# Patient Record
Sex: FEMALE | URBAN - METROPOLITAN AREA
[De-identification: names, ages, dates, MRNs, and addresses within clinical notes are randomized per-mention and may not be internally consistent; named-entity substitution may affect disease eponyms.]

---

## 2022-11-21 ENCOUNTER — APPOINTMENT (RX ONLY)
Dept: URBAN - METROPOLITAN AREA CLINIC 151 | Facility: CLINIC | Age: 42
Setting detail: DERMATOLOGY
End: 2022-11-21

## 2022-11-21 DIAGNOSIS — L71.0 PERIORAL DERMATITIS: ICD-10-CM

## 2022-11-21 PROCEDURE — 99204 OFFICE O/P NEW MOD 45 MIN: CPT

## 2022-11-21 PROCEDURE — ? COUNSELING

## 2022-11-21 PROCEDURE — ? PRESCRIPTION

## 2022-11-21 PROCEDURE — ? PRESCRIPTION MEDICATION MANAGEMENT

## 2022-11-21 RX ORDER — PIMECROLIMUS 10 MG/G
CREAM TOPICAL
Qty: 60 | Refills: 1 | Status: ERX | COMMUNITY
Start: 2022-11-21

## 2022-11-21 RX ORDER — DOXYCYCLINE HYCLATE 100 MG/1
CAPSULE, GELATIN COATED ORAL
Qty: 30 | Refills: 1 | Status: ERX | COMMUNITY
Start: 2022-11-21

## 2022-11-21 RX ORDER — METRONIDAZOLE 7.5 MG/G
CREAM TOPICAL
Qty: 45 | Refills: 1 | Status: ERX | COMMUNITY
Start: 2022-11-21

## 2022-11-21 RX ADMIN — PIMECROLIMUS: 10 CREAM TOPICAL at 00:00

## 2022-11-21 RX ADMIN — METRONIDAZOLE: 7.5 CREAM TOPICAL at 00:00

## 2022-11-21 RX ADMIN — DOXYCYCLINE HYCLATE: 100 CAPSULE, GELATIN COATED ORAL at 00:00

## 2022-11-21 ASSESSMENT — INVESTIGATOR STATIC GLOBAL ASSESSMENT
IN YOUR EXPERIENCE, AMONG ALL PATIENTS YOU HAVE SEEN WITH THIS CONDITION, HOW SEVERE IS THIS PATIENT'S CONDITION?: MILD TO MODERATE

## 2022-11-21 NOTE — HPI: OTHER
Condition:: Rash
Please Describe Your Condition:: Rash is located on the pts face and lips. Rash on the face is itchy and red. Rash on the lips is itchy, red and swollen.  She has d/c’d retinol thinking she may have developed a rash after a years use. Started using CeraVe lotion on the face and Aquaphor on the lips. Stated she had a sample of Eucrisa that she tried, cleared the rash but it returned. Eucrisa was stingy. Rash has been present for 2 months.

## 2022-11-21 NOTE — PROCEDURE: PRESCRIPTION MEDICATION MANAGEMENT
Render In Strict Bullet Format?: No
Discontinue Regimen: All OTC cosmetic products
Continue Regimen: CeraVe lotion and mineral base Sunscreen of SPF 30 or higher
Detail Level: Zone
Initiate Treatment: doxycycline hyclate 100 mg capsule, Sig: Take 1 tablet PO jose with a fatty meal & glass of water. Do not lie down for 1 hour directly after taking. Take 4 hours apart from other medications or supplements.\\n\\nmetronidazole 0.75 % topical cream, Sig: Apply to face BID\\n\\npimecrolimus 1 % topical cream, Sig: Apply BID TAA of face until clear, then reapply PRN for flares.

## 2022-12-19 ENCOUNTER — RX ONLY (OUTPATIENT)
Age: 42
Setting detail: RX ONLY
End: 2022-12-19

## 2022-12-19 RX ORDER — METRONIDAZOLE 7.5 MG/G
CREAM TOPICAL
Qty: 45 | Refills: 1 | Status: ERX

## 2023-01-16 RX ORDER — DOXYCYCLINE HYCLATE 100 MG/1
CAPSULE, GELATIN COATED ORAL
Qty: 30 | Refills: 0 | Status: ERX

## 2023-02-21 ENCOUNTER — APPOINTMENT (RX ONLY)
Dept: URBAN - METROPOLITAN AREA CLINIC 151 | Facility: CLINIC | Age: 43
Setting detail: DERMATOLOGY
End: 2023-02-21

## 2023-02-21 DIAGNOSIS — L71.0 PERIORAL DERMATITIS: ICD-10-CM

## 2023-02-21 PROCEDURE — ? COUNSELING

## 2023-02-21 PROCEDURE — ? DIAGNOSIS COMMENT

## 2023-02-21 PROCEDURE — ? PRESCRIPTION MEDICATION MANAGEMENT

## 2023-02-21 PROCEDURE — ? PRESCRIPTION

## 2023-02-21 PROCEDURE — 99214 OFFICE O/P EST MOD 30 MIN: CPT

## 2023-02-21 RX ORDER — DOXYCYCLINE HYCLATE 50 MG/1
CAPSULE, GELATIN COATED ORAL
Qty: 30 | Refills: 1 | Status: ERX | COMMUNITY
Start: 2023-02-21

## 2023-02-21 RX ADMIN — DOXYCYCLINE HYCLATE: 50 CAPSULE, GELATIN COATED ORAL at 00:00

## 2023-02-21 ASSESSMENT — INVESTIGATOR STATIC GLOBAL ASSESSMENT: IN YOUR EXPERIENCE, AMONG ALL PATIENTS YOU HAVE SEEN WITH THIS CONDITION, HOW SEVERE IS THIS PATIENT'S CONDITION?: MILD

## 2023-02-21 NOTE — PROCEDURE: DIAGNOSIS COMMENT
Render Risk Assessment In Note?: no
Comment: under good control, may wait to fill the doxycycline with the hope she does not need it continue metrocream BID
Detail Level: Simple

## 2023-02-21 NOTE — PROCEDURE: PRESCRIPTION MEDICATION MANAGEMENT
Render In Strict Bullet Format?: No
Discontinue Regimen: doxycycline hyclate 100 mg capsule,
Continue Regimen: CeraVe lotion and mineral base Sunscreen of SPF 30 or higher\\n\\nmetronidazole 0.75 % topical cream, Sig: Apply to face BID
Detail Level: Zone
Initiate Treatment: doxycycline hyclate 50 mg capsule, Sig: Take one pill daily with food and a full glass of water.\\n(Only start, if she has a flare.)

## 2023-05-16 ENCOUNTER — APPOINTMENT (RX ONLY)
Dept: URBAN - METROPOLITAN AREA CLINIC 151 | Facility: CLINIC | Age: 43
Setting detail: DERMATOLOGY
End: 2023-05-16

## 2023-05-16 ENCOUNTER — RX ONLY (OUTPATIENT)
Age: 43
Setting detail: RX ONLY
End: 2023-05-16

## 2023-05-16 DIAGNOSIS — L71.0 PERIORAL DERMATITIS: ICD-10-CM

## 2023-05-16 PROCEDURE — ? DIAGNOSIS COMMENT

## 2023-05-16 PROCEDURE — 99214 OFFICE O/P EST MOD 30 MIN: CPT

## 2023-05-16 PROCEDURE — ? PRESCRIPTION

## 2023-05-16 PROCEDURE — ? PRESCRIPTION MEDICATION MANAGEMENT

## 2023-05-16 PROCEDURE — ? COUNSELING

## 2023-05-16 RX ORDER — SULFACETAMIDE SODIUM, SULFUR 100; 50 MG/G; MG/G
EMULSION TOPICAL QD
Qty: 227 | Refills: 2 | Status: ERX | COMMUNITY
Start: 2023-05-16

## 2023-05-16 RX ORDER — SULFACETAMIDE SODIUM, SULFUR 100; 50 MG/G; MG/G
EMULSION TOPICAL QD
Qty: 227 | Refills: 2 | Status: ERX

## 2023-05-16 RX ADMIN — SULFACETAMIDE SODIUM, SULFUR: 100; 50 EMULSION TOPICAL at 00:00

## 2023-05-16 ASSESSMENT — LOCATION DETAILED DESCRIPTION DERM: LOCATION DETAILED: LEFT LOWER CUTANEOUS LIP

## 2023-05-16 ASSESSMENT — LOCATION ZONE DERM: LOCATION ZONE: LIP

## 2023-05-16 ASSESSMENT — LOCATION SIMPLE DESCRIPTION DERM: LOCATION SIMPLE: LEFT LIP

## 2023-05-16 NOTE — PROCEDURE: DIAGNOSIS COMMENT
Render Risk Assessment In Note?: no
Comment: Pt concerns addressed, pt advised to continue use of Doxycycline 50 mg. Pt advised to initiate use of Sulfur face wash in addition to current regimen.
Detail Level: Simple

## 2023-05-16 NOTE — PROCEDURE: PRESCRIPTION MEDICATION MANAGEMENT
Render In Strict Bullet Format?: No
Discontinue Regimen: doxycycline hyclate 100 mg capsule,
Continue Regimen: doxycycline hyclate 50 mg capsule, Sig: Take one pill daily with food and a full glass of water.\\n(Only start, if she has a flare.)\\n\\nCeraVe lotion and mineral base Sunscreen of SPF 30 or higher\\n\\nmetronidazole 0.75 % topical cream, Sig: Apply to face BID
Detail Level: Zone
Initiate Treatment: Avar 10 %-5 %

## 2023-05-23 ENCOUNTER — RX ONLY (OUTPATIENT)
Age: 43
Setting detail: RX ONLY
End: 2023-05-23

## 2023-05-23 RX ORDER — SULFACETAMIDE SODIUM AND SULFUR 100; 50 MG/G; MG/G
CREAM TOPICAL
Qty: 340 | Refills: 2 | Status: ERX | COMMUNITY
Start: 2023-05-23

## 2023-06-06 ENCOUNTER — RX ONLY (OUTPATIENT)
Age: 43
Setting detail: RX ONLY
End: 2023-06-06

## 2023-06-06 RX ORDER — DOXYCYCLINE HYCLATE 50 MG/1
CAPSULE, GELATIN COATED ORAL
Qty: 30 | Refills: 1 | Status: ERX

## 2023-07-18 ENCOUNTER — APPOINTMENT (RX ONLY)
Dept: URBAN - METROPOLITAN AREA CLINIC 151 | Facility: CLINIC | Age: 43
Setting detail: DERMATOLOGY
End: 2023-07-18

## 2023-07-18 DIAGNOSIS — L71.0 PERIORAL DERMATITIS: ICD-10-CM

## 2023-07-18 PROCEDURE — ? COUNSELING

## 2023-07-18 PROCEDURE — ? PRESCRIPTION

## 2023-07-18 PROCEDURE — ? PRESCRIPTION MEDICATION MANAGEMENT

## 2023-07-18 PROCEDURE — ? DIAGNOSIS COMMENT

## 2023-07-18 PROCEDURE — 99214 OFFICE O/P EST MOD 30 MIN: CPT

## 2023-07-18 RX ORDER — TACROLIMUS 1 MG/G
OINTMENT TOPICAL
Qty: 100 | Refills: 0 | Status: ERX | COMMUNITY
Start: 2023-07-18

## 2023-07-18 RX ADMIN — TACROLIMUS: 1 OINTMENT TOPICAL at 00:00

## 2023-07-18 ASSESSMENT — LOCATION ZONE DERM: LOCATION ZONE: LIP

## 2023-07-18 ASSESSMENT — LOCATION DETAILED DESCRIPTION DERM: LOCATION DETAILED: LEFT LOWER CUTANEOUS LIP

## 2023-07-18 ASSESSMENT — LOCATION SIMPLE DESCRIPTION DERM: LOCATION SIMPLE: LEFT LIP

## 2023-07-18 NOTE — PROCEDURE: DIAGNOSIS COMMENT
Render Risk Assessment In Note?: no
Comment: Looks much improved today, looks like persistent angular chelitis  Today counseled pt on Zinc  and iron vitamins. Refer to PCP ro check iron and blood work. Rxed Tacrolimus to begin using QHS, discontinue 50 mg doxy, continue metronidazole and Avar. Counseled pt on being cautious on putting makeup on the face. Try to keep things off the lips except Vaseline. Discussed with pt on prescribing tacrolimus 100 mg today to friendship and pay out of pocket, denied by insurance previously.
Detail Level: Simple

## 2023-07-18 NOTE — PROCEDURE: PRESCRIPTION MEDICATION MANAGEMENT
Render In Strict Bullet Format?: No
Discontinue Regimen: doxycycline hyclate 100 mg capsule, doxycycline hyclate 50 mg capsule, Sig: Take one pill daily with food and a full glass of water.
Continue Regimen: Avar 10 %-5 %\\n\\nCeraVe lotion and mineral base Sunscreen of SPF 30 or higher\\n\\nmetronidazole 0.75 % topical cream, Sig: Apply to face BID
Detail Level: Zone
Initiate Treatment: Tacrolimus QHS

## 2023-10-10 ENCOUNTER — APPOINTMENT (RX ONLY)
Dept: URBAN - METROPOLITAN AREA CLINIC 151 | Facility: CLINIC | Age: 43
Setting detail: DERMATOLOGY
End: 2023-10-10

## 2023-10-10 DIAGNOSIS — L73.8 OTHER SPECIFIED FOLLICULAR DISORDERS: ICD-10-CM

## 2023-10-10 DIAGNOSIS — L71.0 PERIORAL DERMATITIS: ICD-10-CM

## 2023-10-10 DIAGNOSIS — D18.0 HEMANGIOMA: ICD-10-CM

## 2023-10-10 DIAGNOSIS — L81.4 OTHER MELANIN HYPERPIGMENTATION: ICD-10-CM

## 2023-10-10 PROBLEM — D18.01 HEMANGIOMA OF SKIN AND SUBCUTANEOUS TISSUE: Status: ACTIVE | Noted: 2023-10-10

## 2023-10-10 PROCEDURE — ? SUNSCREEN RECOMMENDATIONS

## 2023-10-10 PROCEDURE — ? PRESCRIPTION MEDICATION MANAGEMENT

## 2023-10-10 PROCEDURE — ? DIAGNOSIS COMMENT

## 2023-10-10 PROCEDURE — 99213 OFFICE O/P EST LOW 20 MIN: CPT

## 2023-10-10 PROCEDURE — ? COUNSELING

## 2023-10-10 ASSESSMENT — LOCATION SIMPLE DESCRIPTION DERM
LOCATION SIMPLE: LEFT CHEEK
LOCATION SIMPLE: LEFT LIP
LOCATION SIMPLE: CHEST
LOCATION SIMPLE: LEFT ANTERIOR NECK
LOCATION SIMPLE: RIGHT CHEEK

## 2023-10-10 ASSESSMENT — LOCATION DETAILED DESCRIPTION DERM
LOCATION DETAILED: LEFT LOWER CUTANEOUS LIP
LOCATION DETAILED: LEFT INFERIOR ANTERIOR NECK
LOCATION DETAILED: LEFT MEDIAL SUPERIOR CHEST
LOCATION DETAILED: STERNAL NOTCH
LOCATION DETAILED: RIGHT CENTRAL MALAR CHEEK
LOCATION DETAILED: LEFT INFERIOR CENTRAL MALAR CHEEK

## 2023-10-10 ASSESSMENT — LOCATION ZONE DERM
LOCATION ZONE: NECK
LOCATION ZONE: LIP
LOCATION ZONE: TRUNK
LOCATION ZONE: FACE

## 2023-10-10 ASSESSMENT — INVESTIGATOR STATIC GLOBAL ASSESSMENT: IN YOUR EXPERIENCE, AMONG ALL PATIENTS YOU HAVE SEEN WITH THIS CONDITION, HOW SEVERE IS THIS PATIENT'S CONDITION?: CLEAR

## 2023-10-10 NOTE — PROCEDURE: DIAGNOSIS COMMENT
Render Risk Assessment In Note?: no
Comment: Patient has been cleared since the end of July. She is slowly introducing products to her skin.
Detail Level: Simple

## 2023-10-10 NOTE — PROCEDURE: SUNSCREEN RECOMMENDATIONS
General Sunscreen Counseling: I recommended a broad spectrum sunscreen with a SPF of 30 or higher.  I explained that SPF 30-50 sunscreens block approximately 97 percent of the sun's harmful rays.  Sunscreens should be applied at least 15 minutes prior to expected sun exposure and then every 2 hours after that as long as sun exposure continues. If swimming or exercising sunscreen should be reapplied every 45 minutes to an hour after getting wet or sweating.  One ounce, or the equivalent of a shot glass full of sunscreen, is adequate to protect the skin not covered by a bathing suit. I also recommended a lip balm with a sunscreen as well. Sun protective clothing can be used in lieu of sunscreen but must be worn the entire time you are exposed to the sun's rays.
Detail Level: Zone

## 2023-10-10 NOTE — PROCEDURE: PRESCRIPTION MEDICATION MANAGEMENT
Render In Strict Bullet Format?: No
Continue Regimen: Avar 10 %-5 %\\n\\nCeraVe lotion and mineral base Sunscreen of SPF 30 or higher\\n\\nmetronidazole 0.75 % topical cream, Sig: Apply to face BID\\n\\nTacrolimus QHS
Detail Level: Zone